# Patient Record
Sex: FEMALE | URBAN - METROPOLITAN AREA
[De-identification: names, ages, dates, MRNs, and addresses within clinical notes are randomized per-mention and may not be internally consistent; named-entity substitution may affect disease eponyms.]

---
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
## 2022-11-02 NOTE — PROCEDURE: ORDER TESTS
Vitamin D 800-2000 unit/day.  Calcium 1200 mg/day.  Exercise 30 minutess 3x/week.   
Bill For Surgical Tray: no
Performing Laboratory: [id number]
Expected Date Of Service: 11/23/2020
Billing Type: Third-Party Bill